# Patient Record
Sex: FEMALE | Race: WHITE | URBAN - METROPOLITAN AREA
[De-identification: names, ages, dates, MRNs, and addresses within clinical notes are randomized per-mention and may not be internally consistent; named-entity substitution may affect disease eponyms.]

---

## 2023-09-28 ENCOUNTER — OFFICE VISIT (OUTPATIENT)
Age: 23
End: 2023-09-28
Payer: COMMERCIAL

## 2023-09-28 VITALS
HEIGHT: 63 IN | TEMPERATURE: 97.7 F | BODY MASS INDEX: 36.37 KG/M2 | SYSTOLIC BLOOD PRESSURE: 138 MMHG | HEART RATE: 78 BPM | RESPIRATION RATE: 14 BRPM | WEIGHT: 205.25 LBS | DIASTOLIC BLOOD PRESSURE: 70 MMHG

## 2023-09-28 DIAGNOSIS — R53.83 FATIGUE, UNSPECIFIED TYPE: ICD-10-CM

## 2023-09-28 DIAGNOSIS — E55.9 VITAMIN D DEFICIENCY: ICD-10-CM

## 2023-09-28 DIAGNOSIS — Z00.00 WELL ADULT EXAM: Primary | ICD-10-CM

## 2023-09-28 DIAGNOSIS — E78.5 DYSLIPIDEMIA: ICD-10-CM

## 2023-09-28 DIAGNOSIS — G40.409 OTHER GENERALIZED EPILEPSY, NOT INTRACTABLE, WITHOUT STATUS EPILEPTICUS (HCC): ICD-10-CM

## 2023-09-28 PROCEDURE — 99395 PREV VISIT EST AGE 18-39: CPT | Performed by: FAMILY MEDICINE

## 2023-09-28 RX ORDER — OXCARBAZEPINE 300 MG/1
300 TABLET, FILM COATED ORAL 3 TIMES DAILY
COMMUNITY

## 2023-09-28 SDOH — ECONOMIC STABILITY: INCOME INSECURITY: HOW HARD IS IT FOR YOU TO PAY FOR THE VERY BASICS LIKE FOOD, HOUSING, MEDICAL CARE, AND HEATING?: NOT HARD AT ALL

## 2023-09-28 SDOH — ECONOMIC STABILITY: FOOD INSECURITY: WITHIN THE PAST 12 MONTHS, THE FOOD YOU BOUGHT JUST DIDN'T LAST AND YOU DIDN'T HAVE MONEY TO GET MORE.: NEVER TRUE

## 2023-09-28 SDOH — ECONOMIC STABILITY: FOOD INSECURITY: WITHIN THE PAST 12 MONTHS, YOU WORRIED THAT YOUR FOOD WOULD RUN OUT BEFORE YOU GOT MONEY TO BUY MORE.: NEVER TRUE

## 2023-09-28 SDOH — ECONOMIC STABILITY: HOUSING INSECURITY
IN THE LAST 12 MONTHS, WAS THERE A TIME WHEN YOU DID NOT HAVE A STEADY PLACE TO SLEEP OR SLEPT IN A SHELTER (INCLUDING NOW)?: NO

## 2023-09-28 ASSESSMENT — PATIENT HEALTH QUESTIONNAIRE - PHQ9
SUM OF ALL RESPONSES TO PHQ9 QUESTIONS 1 & 2: 0
SUM OF ALL RESPONSES TO PHQ QUESTIONS 1-9: 0
1. LITTLE INTEREST OR PLEASURE IN DOING THINGS: 0
SUM OF ALL RESPONSES TO PHQ QUESTIONS 1-9: 0
2. FEELING DOWN, DEPRESSED OR HOPELESS: 0

## 2023-09-28 NOTE — PROGRESS NOTES
[facility]      Date of Visit:  10/4/2023  Patient Name: Jose Brito   Patient :  [id number]     CHIEF COMPLAINT/HPI:     Jose Brito is a 21 y.o. female who presents today for an general visit to be evaluated for the following condition(s):  Chief Complaint   Patient presents with    Check-Up   Patient here for routine visit. She recently called because she needed referral to new neurologist since Dr. Jay Massey was retiring. She does have an appointment with 1 in the same clinic. She has not had seizures in a long while/4yrs, until a few weeks ago she had a breakthrough seizure at 2 AM while sleeping. Her boyfriend said she had grand mal seizure, which she is known to have, she was out of it for a while after and confused. In the past seizures have proven been provoked by stress, she does not feel like that has been much of an issue lately. The only thing she can think of that could possibly related is that she took [address] somewhat close to her usual administration of her seizure med and her understanding was that she is not to do that. 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 She had a Pap a few years ago which was normal with GYN. No other new physical symptoms of concern. REVIEW OF SYSTEM      Review of Systems   HENT:  Positive for congestion. Neurological:  Positive for seizures. All other systems reviewed and are negative. REVIEWED INFORMATION      No Known Allergies    Current Outpatient Medications   Medication Sig Dispense Refill    Midazolam 5 MG/0.1ML SOLN 1 spray in one nostril X2, repeat in opposite nostril in 10min if needed. Max 2 doses per day 1 each 1    OXcarbazepine (TRILEPTAL) 300 MG tablet Take 1 tablet by mouth 3 times daily       No current facility-administered medications for this visit.         Patient Active Problem List   Diagnosis    Well adult exam    Epilepsy [facility])       Past Medical

## 2023-10-04 PROBLEM — G40.909 EPILEPSY (HCC): Status: ACTIVE | Noted: 2023-10-04

## 2023-10-04 PROBLEM — Z00.00 WELL ADULT EXAM: Status: ACTIVE | Noted: 2023-10-04

## 2023-10-04 NOTE — ASSESSMENT & PLAN NOTE
she is in good health. Due to having recent breakthrough seizure, advised to not drive until she sees neurologist for further instructions. Check labs to make sure nothing else is off that might have lowered seizure threshold. Prescription given for midazolam nasal to have on hand to use as needed for breakthrough seizures . Continue regular exercise and healthy eating habits. She will will be due for Pap next year with GYN make sure to schedule. Up-to-date on immunizations. Get flu shot annually.   Follow-up annually otherwise

## 2023-10-05 RX ORDER — OXCARBAZEPINE 300 MG/1
300 TABLET, FILM COATED ORAL 3 TIMES DAILY
Qty: 90 TABLET | Refills: 0 | Status: SHIPPED | OUTPATIENT
Start: 2023-10-05

## 2023-10-05 NOTE — TELEPHONE ENCOUNTER
Jose Brito is calling to request a refill on the following medication(s):    Medication Request:  Requested Prescriptions     Pending Prescriptions Disp Refills    OXcarbazepine (TRILEPTAL) 300 MG tablet 90 tablet 3     Sig: Take 1 tablet by mouth 3 times daily       Last Visit Date (If Applicable):  9/02/5250    Next Visit Date:    Visit date not found

## 2023-11-01 RX ORDER — OXCARBAZEPINE 300 MG/1
300 TABLET, FILM COATED ORAL 3 TIMES DAILY
Qty: 90 TABLET | Refills: 0 | OUTPATIENT
Start: 2023-11-01

## 2023-11-01 NOTE — TELEPHONE ENCOUNTER
Adelina Garcia is calling to request a refill on the following medication(s):    Medication Request:  Requested Prescriptions     Pending Prescriptions Disp Refills    OXcarbazepine (TRILEPTAL) 300 MG tablet [Pharmacy Med Name: OXcarbazepine 300 MG TABLET] 90 tablet 0     Sig: TAKE ONE TABLET BY MOUTH THREE TIMES A DAY       Last Visit Date (If Applicable):  9/28/2023    Next Visit Date:    Visit date not found

## 2023-11-03 PROBLEM — Z00.00 WELL ADULT EXAM: Status: RESOLVED | Noted: 2023-10-04 | Resolved: 2023-11-03
